# Patient Record
Sex: MALE | Race: ASIAN | NOT HISPANIC OR LATINO | URBAN - METROPOLITAN AREA
[De-identification: names, ages, dates, MRNs, and addresses within clinical notes are randomized per-mention and may not be internally consistent; named-entity substitution may affect disease eponyms.]

---

## 2023-07-30 ENCOUNTER — EMERGENCY (EMERGENCY)
Facility: HOSPITAL | Age: 23
LOS: 1 days | Discharge: HOME | End: 2023-07-30
Admitting: EMERGENCY MEDICINE
Payer: MEDICAID

## 2023-07-30 VITALS
RESPIRATION RATE: 16 BRPM | TEMPERATURE: 98 F | HEART RATE: 86 BPM | OXYGEN SATURATION: 98 % | DIASTOLIC BLOOD PRESSURE: 91 MMHG | SYSTOLIC BLOOD PRESSURE: 123 MMHG

## 2023-07-30 PROCEDURE — 99284 EMERGENCY DEPT VISIT MOD MDM: CPT

## 2023-07-30 NOTE — ED ADULT TRIAGE NOTE - CHIEF COMPLAINT QUOTE
c/o right ear pain starting four days ago, reports was poking in it with a qtip and now his hearing feels muffled. Denies fevers, chills, ear drainage.

## 2023-07-31 RX ORDER — IBUPROFEN 200 MG
600 TABLET ORAL ONCE
Refills: 0 | Status: COMPLETED | OUTPATIENT
Start: 2023-07-31 | End: 2023-07-31

## 2023-07-31 RX ORDER — CIPROFLOXACIN AND DEXAMETHASONE 3; 1 MG/ML; MG/ML
4 SUSPENSION/ DROPS AURICULAR (OTIC)
Qty: 1 | Refills: 0
Start: 2023-07-31 | End: 2023-08-06

## 2023-07-31 RX ORDER — IBUPROFEN 200 MG
1 TABLET ORAL
Qty: 28 | Refills: 0
Start: 2023-07-31 | End: 2023-08-06

## 2023-07-31 RX ADMIN — Medication 1 TABLET(S): at 02:39

## 2023-07-31 RX ADMIN — Medication 600 MILLIGRAM(S): at 01:18

## 2023-07-31 NOTE — ED PROVIDER NOTE - OBJECTIVE STATEMENT
22 yo M with no PMH presents to ED c/o worsening right ear pain and swelling today. Reports he noted a pimple in right ear, been poking it a lot with Qtip and 6 hrs ago, noted increased swelling and pain, decreased hearing. Admits to recent swimming. Denies any fever, chills, tinnitus, headache, dizziness, n/v/d, or any other complaints.

## 2023-07-31 NOTE — ED PROVIDER NOTE - RIGHT EAR
no tragal tenderness, there is small swelling obstructing the external canal w/ mild erythema, tenderness, and purulent drainage, unable to visualize TM.

## 2023-07-31 NOTE — ED PROVIDER NOTE - PROGRESS NOTE DETAILS
DEYANIRA JACOBS: pt seen by ENT with I&D of abscess, recommending Augmentin, Ciprodex, follow up in 2 days at ENT clinic. The patient was given verbal and written discharge instructions. Specifically, instructions when to return to the ED and when to seek follow-up from their pcp was discussed. Any specialty follow-up was discussed, including how to make an appointment.  Instructions were discussed in simple, plain language and was understood by the patient. The patient understands that should their symptoms worsen or any new symptoms arise, they should return to the ED immediately for further evaluation. All pt's questions were answered. Patient verbalizes understanding.

## 2023-07-31 NOTE — ED PROVIDER NOTE - PATIENT PORTAL LINK FT
You can access the FollowMyHealth Patient Portal offered by [facility] by registering at the following website: [email or web address]. By joining [employer]’s FollowMyHealth portal, you will also be able to view your health information using other applications (apps) compatible with our system.

## 2023-07-31 NOTE — CONSULT NOTE ADULT - SUBJECTIVE AND OBJECTIVE BOX
HPI:  Patient is a 23y Male with no PMH presenting with ear itching for the past few days now with worsening ear pain and muffled hearing. He states that he first felt a pimple in his ear that he was itching and now it is more swollen. He denies any auricular tenderness, otorrhea, fevers/chills, nausea/vomiting, history of prior abscesses.      Physical Exam  T(C): 36.7 (07-30-23 @ 23:04), Max: 36.7 (07-30-23 @ 23:04)  HR: 86 (07-30-23 @ 23:04) (86 - 86)  BP: 123/91 (07-30-23 @ 23:04) (123/91 - 123/91)  RR: 16 (07-30-23 @ 23:04) (16 - 16)  SpO2: 98% (07-30-23 @ 23:04) (98% - 98%)  General: NAD, A+Ox3  No respiratory distress, stridor, or stertor  Voice quality: normal  Face:  Symmetric without masses or lesions  OU: PERRL, EOMI  AD: Pinna wnl, EAC with erythematous fluctuance blocking view of TM  AS: Pinna wnl, EAC clear, TM intact, no effusion  Nose: nasal cavity clear bilaterally, inferior turbinates normal, mucosa normal without crusting or bleeding  OC/OP: tongue normal, floor of mouth wnl, no masses or lesions, OP clear  Neck: soft/flat, no LAD  CNII-XII intact    Procedure: Incision and drainage of R ear abscess  Anesthesia: lidocaine w/ epi [id number]  Written consent was obtained. Using a headlight for visualization, the patient was injected with 8cc of 1% lidocaine w/epi as an auricular block.  Using an 11 blade scalpel, a linear incision was then made in the center of the area of fluctuance and pus was expressed and cultured. The incision was spread open with a clamp and hemostasis was obtained. The site was irrigated. 1/4 in packing was placed into the site. With decompression, the TM was visible and normal. A wick was placed lateral to the abscess to apply pressure.       Assessment and Plan:   23y year old Male with R EAC abscess s/p I&D. Recovering well.  - f/u cultures  - augmentin  - ciprodex drops  - f/u in clinic in 2 days for wound check/wick removal

## 2023-07-31 NOTE — ED PROVIDER NOTE - NSFOLLOWUPINSTRUCTIONS_ED_ALL_ED_FT
Rest, drink plenty of fluids.  Advance activity as tolerated.  Continue all previously prescribed medications as directed.  Follow up with your primary care physician and ENT in 48-72 hours- bring copies of your results.  Return to the ER for worsening or persistent symptoms, and/or ANY NEW OR CONCERNING SYMPTOMS. If you have issues obtaining follow up, please call: [phone] to obtain a doctor or specialist who takes your insurance in your area.     Take Motrin/Ibuprofen 600 mg every 6-8 hours as needed for moderate pain -- take with food. Rest, drink plenty of fluids.  Advance activity as tolerated.  Continue all previously prescribed medications as directed.  Follow up with your primary care physician and ENT in 48 hours- bring copies of your results.  Return to the ER for worsening or persistent symptoms, and/or ANY NEW OR CONCERNING SYMPTOMS. If you have issues obtaining follow up, please call: [phone] to obtain a doctor or specialist who takes your insurance in your area.     Take Motrin/Ibuprofen 600 mg every 6-8 hours as needed for moderate pain -- take with food.    Take Augmentin 875mg twice a day for 7 days, finish this antibiotic.    Ciprodex ear drops: Instill 4 drops into affected ear(s) twice daily for 7 days.     Call [phone], ENT clinic for follow up in 2 days.

## 2023-07-31 NOTE — ED PROVIDER NOTE - CLINICAL SUMMARY MEDICAL DECISION MAKING FREE TEXT BOX
24 yo M with no PMH presents to ED c/o worsening right ear pain and swelling today. well appearing male. afebrile. concern for external canal abscess 22 yo M with no PMH presents to ED c/o worsening right ear pain and swelling today. well appearing male. afebrile. concern for external canal abscess. Plan: ENT consult, pain control, reassess for antibiotic, likely d/c with ENT follow up.

## 2023-07-31 NOTE — ED ADULT NURSE NOTE - OBJECTIVE STATEMENT
Patient presents to ED A&04 ambulatory at baseline coming in complaning of right ear pain starting 3 days ago. Patient states swelling in ear became worse, now endorses he cannot hear from right ear. Abscess noted to block ear canal. Respirations even and unlabored. Lung sounds clear with equal chest rise bilaterally. ABD is soft, non tender, non distended with normal active bowel sounds No complaints of chest pain, headache, nausea, dizziness, vomiting  SOB, fever, chills verbalized. medicated as ordered. awaiitng ENT

## 2023-08-02 LAB
-  AMPICILLIN/SULBACTAM: SIGNIFICANT CHANGE UP
-  CEFAZOLIN: SIGNIFICANT CHANGE UP
-  CLINDAMYCIN: SIGNIFICANT CHANGE UP
-  ERYTHROMYCIN: SIGNIFICANT CHANGE UP
-  GENTAMICIN: SIGNIFICANT CHANGE UP
-  LINEZOLID: SIGNIFICANT CHANGE UP
-  OXACILLIN: SIGNIFICANT CHANGE UP
-  PENICILLIN: SIGNIFICANT CHANGE UP
-  RIFAMPIN: SIGNIFICANT CHANGE UP
-  TETRACYCLINE: SIGNIFICANT CHANGE UP
-  TRIMETHOPRIM/SULFAMETHOXAZOLE: SIGNIFICANT CHANGE UP
-  VANCOMYCIN: SIGNIFICANT CHANGE UP
METHOD TYPE: SIGNIFICANT CHANGE UP

## 2023-08-03 ENCOUNTER — EMERGENCY (EMERGENCY)
Facility: HOSPITAL | Age: 23
LOS: 1 days | Discharge: HOME | End: 2023-08-03
Attending: EMERGENCY MEDICINE | Admitting: EMERGENCY MEDICINE
Payer: MEDICAID

## 2023-08-03 VITALS
SYSTOLIC BLOOD PRESSURE: 130 MMHG | OXYGEN SATURATION: 98 % | HEART RATE: 85 BPM | RESPIRATION RATE: 18 BRPM | TEMPERATURE: 98 F | DIASTOLIC BLOOD PRESSURE: 80 MMHG

## 2023-08-03 PROBLEM — Z78.9 OTHER SPECIFIED HEALTH STATUS: Chronic | Status: ACTIVE | Noted: 2023-07-31

## 2023-08-03 PROCEDURE — 99283 EMERGENCY DEPT VISIT LOW MDM: CPT

## 2023-08-03 NOTE — ED PROVIDER NOTE - CLINICAL SUMMARY MEDICAL DECISION MAKING FREE TEXT BOX
3-year-old without past medical history presenting following a call back with positive wound cultures.  Patient already sent Bactrim which covers  to bacteria.  Patient well-appearing, scant dried blood in right canal, TM clear. VS not actionable. Denies fever, other systemic signs of infection. Will dc with ent fu

## 2023-08-03 NOTE — ED PROVIDER NOTE - NSFOLLOWUPINSTRUCTIONS_ED_ALL_ED_FT
You are seen in the emergency department following a positive wound culture. Your ear was examined and found to have no acute abnormalities. The proper antibiotics were sent to your pharmacy–this antibiotic is called Bactrim.  Please take as directed on the bottle    Please follow-up with ENT as needed  ENT Clinic number - [phone]    If you have worsening pain or develop a fever please return to the emergency department for reevaluation.

## 2023-08-03 NOTE — ED PROVIDER NOTE - PHYSICAL EXAMINATION
PHYSICAL EXAM:  CONSTITUTIONAL: Well appearing, awake, alert, oriented to person, place, time/situation and in no apparent distress.  HEAD: Atraumatic  EYES: Clear bilaterally, pupils equal, round and reactive to light.  ENMT: Airway patent, Nasal mucosa clear. Mouth with normal mucosa. Uvula is midline. Right TM clear, scant dried blood in right canal   CARDIAC: Normal rate  RESPIRATORY: Breathing unlabored.  SKIN: Skin warm and dry. No evidence of rashes or lesions.

## 2023-08-03 NOTE — ED PROVIDER NOTE - OBJECTIVE STATEMENT
23-year-old male presenting following a call back for positive wound cultures.  Patient was here recently was found to have an inner ear abscess which was drained by ENT.  Wound cultures positive for MRSA.  Patient endorsing mild pain to the ear.  Hearing is intact.  Denies fever. Pt was prescribed bacrim in light of positive culturess.

## 2023-08-03 NOTE — ED ADULT TRIAGE NOTE - RESPIRATORY RATE (BREATHS/MIN)
Date last seen: 6/27/22 for medication check  Date of next visit: 3/13/23    Medication Requested:     Outpatient Current Medications as of as of 1/17/2023       Disp Refills Start End    halobetasol (ULTRAVATE) 0.05 % cream 15 g 0 9/13/2022     Sig: APPLY CREAM TOPICALLY ONCE DAILY AS NEEDED FOR RASH MAXIMUM OF 14 DAYS    Class: Eprescribe      18

## 2023-08-03 NOTE — ED PROVIDER NOTE - ATTENDING CONTRIBUTION TO CARE
Agree with resident note  23-year-old male who was seen here on July 31 for external auditory canal abscess, drained by ENT, call back yesterday due to cultures growing MRSA.  Patient states antibiotics were switched from Keflex to Bactrim.  Was told if there is any worsening to come back to the emergency room, felt slight pain, specifically googled MRSA.  Got concerned given Google findings so presents back to the emergency room.  Patient denies fevers vomiting.  Denies any drainage from ear.  States wick has fallen out.  States attempted to follow-up using number provided but clinic does not take his insurance.  Physical exam  Well-appearing male in no distress  Vital signs stable, afebrile  HEENT no swelling no mastoid tenderness dried blood in right ear, no fluctuance or abscess appreciable  Lungs clear to auscultation bilaterally  Impression  Resolved inner ear abscess, on appropriate antibiotics, shows no signs of being septic, will follow-up with PCP, advised to stay on Bactrim course

## 2023-08-05 LAB
CULTURE RESULTS: SIGNIFICANT CHANGE UP
ORGANISM # SPEC MICROSCOPIC CNT: SIGNIFICANT CHANGE UP
ORGANISM # SPEC MICROSCOPIC CNT: SIGNIFICANT CHANGE UP
SPECIMEN SOURCE: SIGNIFICANT CHANGE UP